# Patient Record
Sex: FEMALE
[De-identification: names, ages, dates, MRNs, and addresses within clinical notes are randomized per-mention and may not be internally consistent; named-entity substitution may affect disease eponyms.]

---

## 2018-12-10 ENCOUNTER — HOSPITAL ENCOUNTER (INPATIENT)
Dept: HOSPITAL 14 - H.ER | Age: 48
LOS: 3 days | Discharge: HOME | DRG: 241 | End: 2018-12-13
Attending: INTERNAL MEDICINE | Admitting: INTERNAL MEDICINE
Payer: MEDICAID

## 2018-12-10 DIAGNOSIS — Z90.49: ICD-10-CM

## 2018-12-10 DIAGNOSIS — D64.9: ICD-10-CM

## 2018-12-10 DIAGNOSIS — Z79.82: ICD-10-CM

## 2018-12-10 DIAGNOSIS — K25.9: Primary | ICD-10-CM

## 2018-12-10 DIAGNOSIS — K44.9: ICD-10-CM

## 2018-12-10 DIAGNOSIS — R13.10: ICD-10-CM

## 2018-12-10 DIAGNOSIS — N92.0: ICD-10-CM

## 2018-12-10 DIAGNOSIS — K29.50: ICD-10-CM

## 2018-12-10 DIAGNOSIS — Z86.73: ICD-10-CM

## 2018-12-10 LAB
ALBUMIN SERPL-MCNC: 4.2 G/DL (ref 3.5–5)
ALBUMIN/GLOB SERPL: 1.4 {RATIO} (ref 1–2.1)
ALT SERPL-CCNC: 28 U/L (ref 9–52)
AST SERPL-CCNC: 18 U/L (ref 14–36)
BASOPHILS # BLD AUTO: 0 K/UL (ref 0–0.2)
BASOPHILS NFR BLD: 1.5 % (ref 0–2)
BUN SERPL-MCNC: 10 MG/DL (ref 7–17)
CALCIUM SERPL-MCNC: 9.1 MG/DL (ref 8.4–10.2)
EOSINOPHIL # BLD AUTO: 0 K/UL (ref 0–0.7)
EOSINOPHIL NFR BLD: 0.6 % (ref 0–4)
ERYTHROCYTE [DISTWIDTH] IN BLOOD BY AUTOMATED COUNT: 17.9 % (ref 11.5–14.5)
GFR NON-AFRICAN AMERICAN: > 60
HGB BLD-MCNC: 7.1 G/DL (ref 12–16)
LIPASE SERPL-CCNC: 36 U/L (ref 23–300)
LYMPHOCYTES # BLD AUTO: 0.4 K/UL (ref 1–4.3)
LYMPHOCYTES NFR BLD AUTO: 13.1 % (ref 20–40)
MCH RBC QN AUTO: 17 PG (ref 27–31)
MCHC RBC AUTO-ENTMCNC: 29.2 G/DL (ref 33–37)
MCV RBC AUTO: 58.2 FL (ref 81–99)
MONOCYTES # BLD: 0.6 K/UL (ref 0–0.8)
MONOCYTES NFR BLD: 18.4 % (ref 0–10)
NEUTROPHILS # BLD: 2.2 K/UL (ref 1.8–7)
NEUTROPHILS NFR BLD AUTO: 66.4 % (ref 50–75)
NRBC BLD AUTO-RTO: 0 % (ref 0–0)
PLATELET # BLD: 350 K/UL (ref 130–400)
PMV BLD AUTO: 9 FL (ref 7.2–11.7)
RBC # BLD AUTO: 4.17 MIL/UL (ref 3.8–5.2)
WBC # BLD AUTO: 3.4 K/UL (ref 4.8–10.8)

## 2018-12-10 PROCEDURE — 30233N1 TRANSFUSION OF NONAUTOLOGOUS RED BLOOD CELLS INTO PERIPHERAL VEIN, PERCUTANEOUS APPROACH: ICD-10-PCS | Performed by: INTERNAL MEDICINE

## 2018-12-10 NOTE — ED PDOC
HPI: Abdomen


Time Seen by Provider: 12/10/18 12:38


Chief Complaint (Nursing): Abdominal Pain


Chief Complaint (Provider): Abdominal Pain


History Per: Patient


History/Exam Limitations: no limitations


Onset/Duration Of Symptoms: Intermittent Episodes (x1 week)


Current Symptoms Are (Timing): Still Present


Additional Complaint(s): 


48 year old female presents to the ED for evaluation of intermittent diffuse 

abdominal pain for the past week associated with nausea, decreased appetite and 

occasional throat pain. She notes the pain worsens when she tries to eat. 

Otherwise denies vomiting, bloody stools, constipation, diarrhea, fever, urinary

symptoms, chest pain, difficulty breathing, and headache.





PMD: [facility]





Past Medical History


Reviewed: Historical Data, Nursing Documentation, Vital Signs


Vital Signs: 





                                Last Vital Signs











Temp  97.0 F L  12/10/18 12:23


 


Pulse  94 H  12/10/18 12:23


 


Resp  16   12/10/18 12:23


 


BP  117/78   12/10/18 12:23


 


Pulse Ox  99   12/10/18 12:23














- Medical History


PMH: Anemia


   Denies: Diabetes, HTN





- Surgical History


Surgical History: Appendectomy


Other surgeries: intestinal surgery





- Family History


Family History: States: Unknown Family Hx





- Social History


Current smoker - smoking cessation education provided: No


Alcohol: None


Drugs: Denies





- Home Medications


Home Medications: 


                                Ambulatory Orders











 Medication  Instructions  Recorded


 


5-Hydroxytryptophan (5-Htp) [5-Htp] 1 cap PO DAILY 12/10/18


 


Lactobacillus Combination No.8 1 cap PO DAILY 12/10/18





[Adult Probiotic]  














- Allergies


Allergies/Adverse Reactions: 


                                    Allergies











Allergy/AdvReac Type Severity Reaction Status Date / Time


 


No Known Allergies Allergy   Verified 12/14/16 15:30














Review of Systems


ROS Statement: Except As Marked, All Systems Reviewed And Found Negative


Constitutional: Negative for: Fever


ENT: Positive for: Throat Pain


Cardiovascular: Negative for: Chest Pain


Respiratory: Negative for: Other (difficulty breathing)


Gastrointestinal: Positive for: Nausea, Abdominal Pain (diffuse).  Negative for:

Vomiting, Diarrhea, Constipation, Other (bloody stool)


Genitourinary Female: Negative for: Dysuria, Frequency, Incontinence


Neurological: Negative for: Headache





Physical Exam





- Reviewed


Nursing Documentation Reviewed: Yes


Vital Signs Reviewed: Yes





- Physical Exam


Appears: Positive for: No Acute Distress


Head Exam: Positive for: ATRAUMATIC, NORMOCEPHALIC


Skin: Positive for: Pallor


Eye Exam: Positive for: Normal appearance, EOMI, PERRL.  Negative for: Scleral 

icterus


ENT: Positive for: Normal ENT Inspection


Neck: Positive for: Normal, Painless ROM, Supple


Cardiovascular/Chest: Positive for: Regular Rate, Rhythm, Tachycardia


Respiratory: Positive for: Normal Breath Sounds.  Negative for: Respiratory 

Distress


Gastrointestinal/Abdominal: Positive for: Normal Exam, Soft, Tenderness (diffuse

mild tenderness).  Negative for: Distended, Guarding, Rebound


Back: Positive for: Normal Inspection.  Negative for: L CVA Tenderness, R CVA 

Tenderness


Rectal: Positive for: Normal Exam.  Negative for: Black Stool, Blood Streaked 

Stool


Extremity: Positive for: Normal ROM (upper and lower extremities).  Negative 

for: Deformity


Neurologic/Psych: Positive for: Alert, Oriented (x3)





- Laboratory Results


Result Diagrams: 


                                 12/10/18 13:24





                                 12/10/18 13:24





- ECG


O2 Sat by Pulse Oximetry: 99 (RA)


Pulse Ox Interpretation: Normal





Medical Decision Making


Medical Decision Making: 


Time: 1311


Initial Impression: abdominal pain


Ddx includes: gastritis, pancreatitis, small bowel obstruction, and possible UTI


Initial Plan: 


--CT abd/pelvis with IV contrast


--CMP


--Lipase


--U-dip


--U-preg


--CBC with differential


--Morphine 2mg IM


--IV fluids


--Iohexol 50ml PO








16:43 


CT abd/pelvis


FINDINGS:





LOWER THORAX:


Unremarkable. 





LIVER:


Unremarkable. No gross lesion or ductal dilatation. 





GALLBLADDER AND BILE DUCTS:


Unremarkable. 





PANCREAS:


Unremarkable. No gross lesion or ductal dilatation.





SPLEEN:


Multiple small (less than 1 cm) splenic cysts., otherwise normal spleen 





ADRENALS:


Unremarkable. No mass. 





KIDNEYS AND URETERS:


Unremarkable. No hydronephrosis. No solid mass. 





VASCULATURE:


Unremarkable. No aortic aneurysm. No atherosclerotic calcification or mural 

plaque present.





BOWEL:


Unremarkable. No obstruction. No gross mural thickening. 





APPENDIX:


No abnormalities to suggest acute appendicitis. No right lower quadrant 

inflammatory processes identified. 





PERITONEUM:


Unremarkable. No free fluid. No free air. 





LYMPH NODES:


Unremarkable. No enlarged lymph nodes. 





BLADDER:


Unremarkable. 





REPRODUCTIVE:


Unremarkable. 





BONES:


No acute fracture. 





OTHER FINDINGS:


None.





IMPRESSION:


No significant or acute  findings to account for/ related to the clinical 

presentation.





Additional benign and/or incidental findings described above.











Chaperone for rectal exam was Kelby Mark. 








16:45 


--Patient experienced a very brief episode of weakness and syncope while in her 

room after walking to the bathroom. She denies hitting her head and was 

conscious when this provider arrived. 





16:57 


--Patient will be admitted to observation due to severe anemia, abdominal pain 

and syncope. 





--------------------------

-----------------------------------------------------------------------


Scribe Attestation:


Documented by Shirin De Souza acting as a scribe for Maria Isabel Hill MD.


Provider Scribe Attestation:


All medical record entries made by the Scribe were at my direction and 

personally dictated by me. I have reviewed the chart and agree that the record 

accurately reflects my personal performance of the history, physical exam, 

medical decision making, and the department course for this patient. I have also

personally directed, reviewed, and agree with the discharge instructions and 

disposition.





Disposition





- Clinical Impression


Clinical Impression: 


 Abdominal pain, Severe anemia, Syncope








- Patient ED Disposition


Is Patient to be Admitted: Yes


Discussed With DrEmmanuel: Naima Craft


Counseled Patient/Family Regarding: Studies Performed, Diagnosis





- Disposition


Disposition Time: 16:57


Condition: FAIR





- Pt Status Changed To:


Hospital Disposition Of: Observation





- POA


Present On Arrival: Falls Or Trauma

## 2018-12-10 NOTE — CP.PCM.HP
History of Present Illness





- History of Present Illness


History of Present Illness: 





CC: weakness





HPI: 48 year old female with reported Hx CVA appx one year ago (not noted on 

head CT today) on aspirin daily, presented to the ED today for several 

complaints. She reports moderate to severe worsening generalized weakness 

ongoing for about one month. She also has heavy menses lasting 5 days a month 

using 5-10 pads daily. Patient denies any hematemesis, melena, hematochezia. She

also complains of generalized burning abdominal pain mild to moderate, 

associated with reflux like symptoms. Patient appears pale, weak. Tachy 101-110,

normotensive. CT abd pel negative. H/H 7.1/24. Afebrile, no wbc/bands. 1 unit 

PRBC ordered in ED, occult blood ordered. Will initiate PPI. Monitor overnight 

for symptomatic anemia. HD stable, no acute distress.





ROS: per HPI all other systems reviewed and negative
































Present on Admission





- Present on Admission


Any Indicators Present on Admission: No





Past Patient History





- Past Social History


Alcohol: None


Drugs: Denies





- CARDIAC


Hx Hypertension: No





- HEMATOLOGICAL/ONCOLOGICAL


Hx Anemia: Yes





- MUSCULOSKELETAL/RHEUMATOLOGICAL


Hx Falls: No





- PSYCHIATRIC


Hx Substance Use: No





- SURGICAL HISTORY


Hx Appendectomy: Yes





- ANESTHESIA


Hx Anesthesia: Yes


Hx Anesthesia Reactions: No





Meds


Allergies/Adverse Reactions: 


                                    Allergies











Allergy/AdvReac Type Severity Reaction Status Date / Time


 


No Known Allergies Allergy   Verified 12/14/16 15:30














Physical Exam





- Constitutional


Appears: Non-toxic, No Acute Distress





- Head Exam


Head Exam: ATRAUMATIC, NORMOCEPHALIC





- Eye Exam


Eye Exam: EOMI, Normal appearance, PERRL





- ENT Exam


ENT Exam: Mucous Membranes Moist, Normal Oropharynx





- Respiratory Exam


Respiratory Exam: Clear to Auscultation Bilateral, NORMAL BREATHING PATTERN





- Cardiovascular Exam


Cardiovascular Exam: Tachycardia, +S1, +S2





- GI/Abdominal Exam


GI & Abdominal Exam: Normal Bowel Sounds, Soft.  absent: Mass, Organomegaly





- Extremities Exam


Extremities exam: Positive for: normal capillary refill, pedal pulses present





- Back Exam


Back exam: absent: CVA tenderness (L), CVA tenderness (R)





- Neurological Exam


Neurological exam: Alert, Oriented x3, Reflexes Normal





- Psychiatric Exam


Psychiatric exam: Normal Affect, Normal Mood





- Skin


Skin Exam: Dry, Pallor, Warm





Results





- Vital Signs


Recent Vital Signs: 





                                Last Vital Signs











Temp  98.1 F   12/10/18 17:09


 


Pulse  99 H  12/10/18 17:59


 


Resp  18   12/10/18 17:59


 


BP  129/68   12/10/18 17:59


 


Pulse Ox  100   12/10/18 17:59














- Labs


Result Diagrams: 


                                 12/10/18 13:24





                                 12/10/18 13:24


Labs: 





                         Laboratory Results - last 24 hr











  12/10/18 12/10/18 12/10/18





  13:24 13:24 17:04


 


WBC  3.4 L  


 


RBC  4.17  


 


Hgb  7.1 L  


 


Hct  24.2 L  


 


MCV  58.2 L  


 


MCH  17.0 L  


 


MCHC  29.2 L  


 


RDW  17.9 H  


 


Plt Count  350  D  


 


MPV  9.0  


 


Neut % (Auto)  66.4  


 


Lymph % (Auto)  13.1 L  


 


Mono % (Auto)  18.4 H  


 


Eos % (Auto)  0.6  


 


Baso % (Auto)  1.5  


 


Neut # (Auto)  2.2  


 


Lymph # (Auto)  0.4 L  


 


Mono # (Auto)  0.6  


 


Eos # (Auto)  0.0  


 


Baso # (Auto)  0.0  


 


Sodium   137 


 


Potassium   3.9 


 


Chloride   103 


 


Carbon Dioxide   25 


 


Anion Gap   13 


 


BUN   10 


 


Creatinine   0.5 L 


 


Est GFR ( Amer)   > 60 


 


Est GFR (Non-Af Amer)   > 60 


 


POC Glucose (mg/dL)    93


 


Random Glucose   90 


 


Calcium   9.1 


 


Total Bilirubin   0.6 


 


AST   18 


 


ALT   28 


 


Alkaline Phosphatase   51 


 


Troponin I   


 


Total Protein   7.3 


 


Albumin   4.2 


 


Globulin   3.0 


 


Albumin/Globulin Ratio   1.4 


 


Lipase   36 


 


Blood Type   


 


Antibody Screen   


 


BBK History Checked   














  12/10/18 12/10/18





  17:05 17:05


 


WBC  


 


RBC  


 


Hgb  


 


Hct  


 


MCV  


 


MCH  


 


MCHC  


 


RDW  


 


Plt Count  


 


MPV  


 


Neut % (Auto)  


 


Lymph % (Auto)  


 


Mono % (Auto)  


 


Eos % (Auto)  


 


Baso % (Auto)  


 


Neut # (Auto)  


 


Lymph # (Auto)  


 


Mono # (Auto)  


 


Eos # (Auto)  


 


Baso # (Auto)  


 


Sodium  


 


Potassium  


 


Chloride  


 


Carbon Dioxide  


 


Anion Gap  


 


BUN  


 


Creatinine  


 


Est GFR ( Amer)  


 


Est GFR (Non-Af Amer)  


 


POC Glucose (mg/dL)  


 


Random Glucose  


 


Calcium  


 


Total Bilirubin  


 


AST  


 


ALT  


 


Alkaline Phosphatase  


 


Troponin I  < 0.0120 


 


Total Protein  


 


Albumin  


 


Globulin  


 


Albumin/Globulin Ratio  


 


Lipase  


 


Blood Type   B POSITIVE


 


Antibody Screen   Negative


 


BBK History Checked   Patient has bt














Assessment & Plan





- Assessment and Plan (Free Text)


Plan: 





48 year old female with reported Hx CVA appx one year ago (not noted on head CT 

today) on aspirin daily, presented to the ED today for several complaints. She 

reports moderate to severe worsening generalized weakness ongoing for about one 

month. She also has heavy menses lasting 5 days a month using 5-10 pads daily. 

Patient denies any hematemesis, melena, hematochezia. She also complains of 

generalized burning abdominal pain mild to moderate, associated with reflux like

symptoms. Patient appears pale, weak. Tachy 101-110, normotensive. CT abd pel 

negative. H/H 7.1/24. Afebrile, no wbc/bands. 1 unit PRBC ordered in ED, occult 

blood ordered. Will initiate PPI. Monitor overnight for symptomatic anemia. HD 

stable, no acute distress.








Symptomatic Anemia


- 1 unit PRBC in ED


- repeat CBC in AM


- HOLD ASA and any ac for dvt ppx


- monitor on tele





Hx of CVA


- patient reports hx of CVA however none noted on head CT





Abdominal Pain, discomfort


- CTAP negative


- start PPI


- Occult blood pending








DVT ppx


SCDs, as concern for bleed given anemia

## 2018-12-10 NOTE — CT
Date of service: 



12/10/2018



PROCEDURE:  CT HEAD WITHOUT CONTRAST.



HISTORY:

Syncope



COMPARISON:

None available.



TECHNIQUE:

Axial computed tomography images were obtained through the head/brain 

without intravenous contrast.  



Radiation dose:



Total exam DLP = 801.5 mGy-cm.



This CT exam was performed using one or more of the following dose 

reduction techniques: Automated exposure control, adjustment of the 

mA and/or kV according to patient size, and/or use of iterative 

reconstruction technique.



FINDINGS:



HEMORRHAGE:

No intracranial hemorrhage. 



BRAIN:

No mass effect or edema.  No atrophy or chronic microvascular 

ischemic changes.



VENTRICLES:

Unremarkable. No hydrocephalus. 



CALVARIUM:

Unremarkable..



PARANASAL SINUSES:

Unremarkable as visualized. No significant inflammatory changes.



MASTOID AIR CELLS:

Unremarkable as visualized. No inflammatory changes.



OTHER FINDINGS:

None.



IMPRESSION:

No acute intracranial hemorrhage

## 2018-12-10 NOTE — CT
Date of service: 



12/10/2018



PROCEDURE:  CT Abdomen and Pelvis with contrast



HISTORY:

Abdominal pain. 



Negative pregnancy test (concurrent with this examination).



COMPARISON:

None.



TECHNIQUE:

Intravenous contrast dose: 90 CC OMNIPAQUE 300



Radiation dose:



Total exam DLP = 370.60 mGy-cm.



This CT exam was performed using one or more of the following dose 

reduction techniques: Automated exposure control, adjustment of the 

mA and/or kV according to patient size, and/or use of iterative 

reconstruction technique.



FINDINGS:



LOWER THORAX:

Unremarkable. 



LIVER:

Unremarkable. No gross lesion or ductal dilatation. 



GALLBLADDER AND BILE DUCTS:

Unremarkable. 



PANCREAS:

Unremarkable. No gross lesion or ductal dilatation.



SPLEEN:

Multiple small (less than 1 cm) splenic cysts., otherwise normal 

spleen 



ADRENALS:

Unremarkable. No mass. 



KIDNEYS AND URETERS:

Unremarkable. No hydronephrosis. No solid mass. 



VASCULATURE:

Unremarkable. No aortic aneurysm. No atherosclerotic calcification or 

mural plaque present.



BOWEL:

Unremarkable. No obstruction. No gross mural thickening. 



APPENDIX:

No abnormalities to suggest acute appendicitis. No right lower 

quadrant inflammatory processes identified. 



PERITONEUM:

Unremarkable. No free fluid. No free air. 



LYMPH NODES:

Unremarkable. No enlarged lymph nodes. 



BLADDER:

Unremarkable. 



REPRODUCTIVE:

Unremarkable. 



BONES:

No acute fracture. 



OTHER FINDINGS:

None.



IMPRESSION:

No significant or acute  findings to account for/ related to the 

clinical presentation.



Additional benign and/or incidental findings described above.

## 2018-12-11 LAB
BUN SERPL-MCNC: 10 MG/DL (ref 7–17)
CALCIUM SERPL-MCNC: 8.7 MG/DL (ref 8.4–10.2)
ERYTHROCYTE [DISTWIDTH] IN BLOOD BY AUTOMATED COUNT: 20.2 % (ref 11.5–14.5)
GFR NON-AFRICAN AMERICAN: > 60
HGB BLD-MCNC: 8 G/DL (ref 12–16)
MCH RBC QN AUTO: 18.8 PG (ref 27–31)
MCHC RBC AUTO-ENTMCNC: 30.5 G/DL (ref 33–37)
MCV RBC AUTO: 61.5 FL (ref 81–99)
PLATELET # BLD: 288 K/UL (ref 130–400)
RBC # BLD AUTO: 4.25 MIL/UL (ref 3.8–5.2)
WBC # BLD AUTO: 3.3 K/UL (ref 4.8–10.8)

## 2018-12-11 RX ADMIN — PANTOPRAZOLE SODIUM SCH MG: 20 TABLET, DELAYED RELEASE ORAL at 09:32

## 2018-12-11 NOTE — RAD
Date of service: 



12/11/2018



PROCEDURE:  Modified barium swallow study.



HISTORY:

dysphagia to liquids and solids



COMPARISON:

None available. 



TECHNIQUE:

Under fluoroscopic guidance, barium meals of various consistency were 

administered to the patient by the speech pathologist.



Fluoroscopic time: 66.5 second. 



Cumulative radiation dose: 2.53 mGy 



FINDINGS:

No penetration or aspiration was observed during this study.



IMPRESSION:

No penetration or aspiration observed. Please refer to the detailed 

report and recommendations of the speech pathologist.

## 2018-12-11 NOTE — CARD
--------------- APPROVED REPORT --------------





Date of service: 12/10/2018



EKG Measurement

Heart [id number]

IN [id number]

[id number]

[id number]

[id number]



<Conclusion>

Sinus tachycardia

Low voltage QRS

Nonspecific ST and T wave abnormality

Abnormal ECG

## 2018-12-12 LAB
% IRON SATURATION: 8 % (ref 20–55)
BUN SERPL-MCNC: 10 MG/DL (ref 7–17)
CALCIUM SERPL-MCNC: 9.2 MG/DL (ref 8.4–10.2)
ERYTHROCYTE [DISTWIDTH] IN BLOOD BY AUTOMATED COUNT: 24.9 % (ref 11.5–14.5)
GFR NON-AFRICAN AMERICAN: > 60
HGB BLD-MCNC: 9.6 G/DL (ref 12–16)
IRON SERPL-MCNC: 33 UG/DL (ref 37–170)
MCH RBC QN AUTO: 19.8 PG (ref 27–31)
MCHC RBC AUTO-ENTMCNC: 30.7 G/DL (ref 33–37)
MCV RBC AUTO: 64.5 FL (ref 81–99)
PLATELET # BLD: 312 K/UL (ref 130–400)
RBC # BLD AUTO: 4.85 MIL/UL (ref 3.8–5.2)
TIBC SERPL-MCNC: 397 UG/DL (ref 250–450)
WBC # BLD AUTO: 4.2 K/UL (ref 4.8–10.8)

## 2018-12-12 RX ADMIN — PANTOPRAZOLE SODIUM SCH MG: 20 TABLET, DELAYED RELEASE ORAL at 08:51

## 2018-12-12 NOTE — CP.PCM.PN
Subjective





- Date & Time of Evaluation


Date of Evaluation: 12/12/18


Time of Evaluation: 07:00





- Subjective


Subjective: 





Pt seen and examined at bedside. Reports improvement in abdominal pain, however,

still occurs while eating. No acute events overnight. Denies N/V/CP/SOB





Objective





- Vital Signs/Intake and Output


Vital Signs (last 24 hours): 


                                        











Temp Pulse Resp BP Pulse Ox


 


 98.5 F   77   20   101/63   99 


 


 12/12/18 12:21  12/12/18 12:21  12/12/18 12:21  12/12/18 12:21  12/12/18 12:21











- Medications


Medications: 


                               Current Medications





Acetaminophen (Tylenol 325mg Tab)  650 mg PO Q6 PRN


   PRN Reason: Pain, Mild (1-3)


Docusate Sodium (Colace)  100 mg PO BID [facility]


   Last Admin: 12/12/18 09:53 Dose:  100 mg


Morphine Sulfate (Morphine)  1 mg IVP Q4 PRN


   PRN Reason: Pain, moderate (4-7)


   Last Admin: 12/12/18 06:13 Dose:  1 mg


Morphine Sulfate (Morphine)  2 mg IVP Q4 PRN


   PRN Reason: Pain, severe (8-10)


   Last Admin: 12/11/18 20:38 Dose:  2 mg


Pantoprazole Sodium (Protonix Ec Tab)  20 mg PO DAILY [facility]


   Last Admin: 12/12/18 08:51 Dose:  20 mg


Sucralfate (Carafate Tab)  1 gm PO TID [facility]


   Last Admin: 12/12/18 12:03 Dose:  1 gm











- Labs


Labs: 


                                        





                                 12/12/18 04:25 





                                 12/12/18 04:25 











- Constitutional


Appears: Well, Non-toxic





- Eye Exam


Eye Exam: EOMI





- ENT Exam


ENT Exam: Mucous Membranes Moist





- Respiratory Exam


Respiratory Exam: Clear to Ausculation Bilateral, NORMAL BREATHING PATTERN.  ab

sent: Wheezes





- Cardiovascular Exam


Cardiovascular Exam: REGULAR RHYTHM, +S1, +S2





- GI/Abdominal Exam


GI & Abdominal Exam: Soft, Tenderness (epigastric), Normal Bowel Sounds





- Neurological Exam


Neurological Exam: Alert, Awake, CN II-XII Intact, Normal Gait, Oriented x3





- Psychiatric Exam


Psychiatric exam: Normal Affect, Normal Mood





Assessment and Plan





- Assessment and Plan (Free Text)


Assessment: 





49 yo F with pmhs of reported CVA 1 year ago on daily ASA admitted for sympt

omatic anemia. 


Plan: 





Anemia


-Symptomatic: fatigue/lethargy; improved s/p 2 unit PRBC


-H/H: 7.1/24.2 > 8.0/26.2 > 9.6/31.3


-MCV: 61.5 


-FE: 33; TIBC: 397; % SAT: 8; Ferritin: 14.4


-LMP 11/28/2018; no active vaginal bleed


-f/u labs





Dysphagia


-2 months: progressive with solids and fluids


-Hx of candida esophagitis, treated


-Barium swallow screen: no penetration or aspiration


-Upper GI series: pending


-R/O gopal guzman 


-GI: Dr. Malcolm: EGD 12/13/2018





Epigstric pain


-Intermittent; with foods


-ddx: gastric ulcer


-CTAP negative


-on ppi


-started sucralfate


-pending occult blood


-GI: for EGD 12/13/2018





Hx of CVA


-CT head reviewed


-held ASA





DVT/GI prophylaxis


-SCDs


-Pantoprazole





Future discharge planning: to f/u with Dr. Shah 





Plan d/w Dr. Jeanie Coles MD PGY2

## 2018-12-12 NOTE — RAD
Date of service: 



12/12/2018



HISTORY:

 dysphagia solids and liquids 



COMPARISON:

None available.



FINDINGS:



BOWEL:

Extensive contrast throughout the left and right colon including 

portions of the transverse colon.  Colonic diverticulosis suggested.  

Evaluation of the gastric antrum duodenum would be expected to be 

obscured



No bowel obstruction noted. 



BONES:

Normal.



OTHER FINDINGS:

None.



IMPRESSION:

Extensive residual contrast throughout the colon including the 

transverse colon.



Left colonic diverticulosis noted.  No bowel obstruction suggested.

## 2018-12-12 NOTE — CP.PCM.CON
History of Present Illness





- History of Present Illness


History of Present Illness: 





48 yo female with h/o CVA a year ago and on ASA admitted with severe anemia. Has

been having heavy periods. Reports difficulty swallowing and pain in throat. 

Modified Barium swallow was negative.





Review of Systems





- Constitutional


Constitutional: absent: Chills





- EENT


Eyes: absent: Blurred Vision


Ears: absent: Decreased Hearing


Nose/Mouth/Throat: absent: Nasal Congestion





- Cardiovascular


Cardiovascular: absent: Chest Pain





- Respiratory


Respiratory: absent: Dyspnea





- Gastrointestinal


Gastrointestinal: absent: Abdominal Pain





- Genitourinary


Genitourinary: absent: Change in Urinary Stream





Past Patient History





- Past Medical History & Family History


Past Medical History?: Yes





- Past Social History


Smoking Status: Never Smoked





- CARDIAC


Hx Cardiac Disorders: No


Hx Hypertension: No





- PULMONARY


Hx Respiratory Disorders: No





- NEUROLOGICAL


Hx Neurological Disorder: No





- HEENT


Hx HEENT Problems: No





- RENAL


Hx Chronic Kidney Disease: No





- ENDOCRINE/METABOLIC


Hx Endocrine Disorders: No





- HEMATOLOGICAL/ONCOLOGICAL


Hx Blood Disorders: Yes


Hx Anemia: Yes





- INTEGUMENTARY


Hx Dermatological Problems: No





- MUSCULOSKELETAL/RHEUMATOLOGICAL


Hx Musculoskeletal Disorders: No


Hx Falls: No





- GASTROINTESTINAL


Hx Gastrointestinal Disorders: No





- GENITOURINARY/GYNECOLOGICAL


Hx Genitourinary Disorders: No





- PSYCHIATRIC


Hx Psychophysiologic Disorder: No


Hx Substance Use: No





- SURGICAL HISTORY


Hx Surgeries: Yes


Hx Appendectomy: Yes


Hx  Section: Yes


Other/Comment: intestinal surgery





- ANESTHESIA


Hx Anesthesia: Yes


Hx Anesthesia Reactions: No


Hx Malignant Hyperthermia: No


Has any member of the family had a problem w/ anesthesia?: No





Meds


Allergies/Adverse Reactions: 


                                    Allergies











Allergy/AdvReac Type Severity Reaction Status Date / Time


 


No Known Allergies Allergy   Verified 16 15:30














- Medications


Medications: 


                               Current Medications





Acetaminophen (Tylenol 325mg Tab)  650 mg PO Q6 PRN


   PRN Reason: Pain, Mild (1-3)


Docusate Sodium (Colace)  100 mg PO BID [facility]


   Last Admin: 18 16:38 Dose:  100 mg


Morphine Sulfate (Morphine)  1 mg IVP Q4 PRN


   PRN Reason: Pain, moderate (4-7)


   Last Admin: 18 06:13 Dose:  1 mg


Morphine Sulfate (Morphine)  2 mg IVP Q4 PRN


   PRN Reason: Pain, severe (8-10)


   Last Admin: 18 20:38 Dose:  2 mg


Pantoprazole Sodium (Protonix Ec Tab)  20 mg PO DAILY [facility]


   Last Admin: 18 08:51 Dose:  20 mg


Sucralfate (Carafate Tab)  1 gm PO TID [facility]


   Last Admin: 18 16:38 Dose:  1 gm











Physical Exam





- Constitutional


Appears: No Acute Distress





- Head Exam


Head Exam: ATRAUMATIC





- Eye Exam


Eye Exam: Normal appearance





- ENT Exam


ENT Exam: Mucous Membranes Moist





- Respiratory Exam


Respiratory Exam: Clear to Auscultation Bilateral





- Cardiovascular Exam


Cardiovascular Exam: REGULAR RHYTHM, +S1, +S2





- GI/Abdominal Exam


GI & Abdominal Exam: Normal Bowel Sounds, Soft.  absent: Tenderness





Results





- Vital Signs


Recent Vital Signs: 


                                Last Vital Signs











Temp  97.9 F   18 16:00


 


Pulse  77   18 16:00


 


Resp  16   18 16:00


 


BP  108/72   18 16:00


 


Pulse Ox  98   18 16:00














- Labs


Result Diagrams: 


                                 18 04:25





                                 18 04:25


Labs: 


                         Laboratory Results - last 24 hr











  12/10/18 12/11/18 12/12/18





  17:05 10:32 04:25


 


WBC    4.2 L


 


RBC    4.85


 


Hgb    9.6 L


 


Hct    31.3 L


 


MCV    64.5 L D


 


MCH    19.8 L


 


MCHC    30.7 L


 


RDW    24.9 H


 


Plt Count    312


 


Sodium   


 


Potassium   


 


Chloride   


 


Carbon Dioxide   


 


Anion Gap   


 


BUN   


 


Creatinine   


 


Est GFR ( Amer)   


 


Est GFR (Non-Af Amer)   


 


Random Glucose   


 


Calcium   


 


Iron   


 


TIBC   


 


% Saturation   


 


Ferritin   


 


HIV 1&2 Ag/Ab, 4th Gen   Nonreactive 


 


Crossmatch  See Detail  














  18





  04:25 10:02 10:02


 


WBC   


 


RBC   


 


Hgb   


 


Hct   


 


MCV   


 


MCH   


 


MCHC   


 


RDW   


 


Plt Count   


 


Sodium  139  


 


Potassium  3.7  


 


Chloride  102  


 


Carbon Dioxide  27  


 


Anion Gap  14  


 


BUN  10  


 


Creatinine  0.6 L  


 


Est GFR ( Amer)  > 60  


 


Est GFR (Non-Af Amer)  > 60  


 


Random Glucose  86  


 


Calcium  9.2  


 


Iron   33 L 


 


TIBC   397 


 


% Saturation   8 L 


 


Ferritin    14.4


 


HIV 1&2 Ag/Ab, 4th Gen   


 


Crossmatch   














Assessment & Plan


(1) Dysphagia


Assessment and Plan: 


47 yo with dysphagia and odynophagia. Upper endoscopy to r/o esophagitis, ulcer 

or partially obstructing esophageal lesion.


Status: Acute

## 2018-12-13 VITALS
DIASTOLIC BLOOD PRESSURE: 65 MMHG | SYSTOLIC BLOOD PRESSURE: 100 MMHG | OXYGEN SATURATION: 99 % | HEART RATE: 76 BPM | RESPIRATION RATE: 16 BRPM

## 2018-12-13 VITALS — TEMPERATURE: 98 F

## 2018-12-13 LAB
APTT BLD: 27 SECONDS (ref 25.6–37.1)
BUN SERPL-MCNC: 15 MG/DL (ref 7–17)
CALCIUM SERPL-MCNC: 9.4 MG/DL (ref 8.4–10.2)
ERYTHROCYTE [DISTWIDTH] IN BLOOD BY AUTOMATED COUNT: 25.4 % (ref 11.5–14.5)
GFR NON-AFRICAN AMERICAN: > 60
HGB BLD-MCNC: 9.9 G/DL (ref 12–16)
INR PPP: 1.1
MCH RBC QN AUTO: 19.8 PG (ref 27–31)
MCHC RBC AUTO-ENTMCNC: 30.7 G/DL (ref 33–37)
MCV RBC AUTO: 64.6 FL (ref 81–99)
PLATELET # BLD: 300 K/UL (ref 130–400)
PROTHROMBIN TIME: 12.3 SECONDS (ref 9.8–13.1)
RBC # BLD AUTO: 4.99 MIL/UL (ref 3.8–5.2)
WBC # BLD AUTO: 4.5 K/UL (ref 4.8–10.8)

## 2018-12-13 PROCEDURE — 0DB38ZX EXCISION OF LOWER ESOPHAGUS, VIA NATURAL OR ARTIFICIAL OPENING ENDOSCOPIC, DIAGNOSTIC: ICD-10-PCS | Performed by: INTERNAL MEDICINE

## 2018-12-13 PROCEDURE — 0DB18ZX EXCISION OF UPPER ESOPHAGUS, VIA NATURAL OR ARTIFICIAL OPENING ENDOSCOPIC, DIAGNOSTIC: ICD-10-PCS | Performed by: INTERNAL MEDICINE

## 2018-12-13 NOTE — CP.PCM.DIS
Provider





- Provider


Date of Admission: 


12/12/18 14:51





Attending physician: 


Naima Craft DO





Consults: 








12/11/18 09:22


Gastroenterology Consult Routine 


   Comment: 


   Consulting Provider: Brant Malcolm


   Consulting Physician: Brant Malcolm


   Reason for Consult: dysphagia to solids and liquids











Time Spent in preparation of Discharge (in minutes): 30





Hospital Course





- Lab Results


Lab Results: 


                             Most Recent Lab Values











WBC  4.5 K/uL (4.8-10.8)  L  12/13/18  04:20    


 


RBC  4.99 Mil/uL (3.80-5.20)   12/13/18  04:20    


 


Hgb  9.9 g/dL (12.0-16.0)  L  12/13/18  04:20    


 


Hct  32.2 % (34.0-47.0)  L  12/13/18  04:20    


 


MCV  64.6 fl (81.0-99.0)  L  12/13/18  04:20    


 


MCH  19.8 pg (27.0-31.0)  L  12/13/18  04:20    


 


MCHC  30.7 g/dL (33.0-37.0)  L  12/13/18  04:20    


 


RDW  25.4 % (11.5-14.5)  H  12/13/18  04:20    


 


Plt Count  300 K/uL (130-400)   12/13/18  04:20    


 


MPV  9.0 fl (7.2-11.7)   12/10/18  13:24    


 


Neut % (Auto)  66.4 % (50.0-75.0)   12/10/18  13:24    


 


Lymph % (Auto)  13.1 % (20.0-40.0)  L  12/10/18  13:24    


 


Mono % (Auto)  18.4 % (0.0-10.0)  H  12/10/18  13:24    


 


Eos % (Auto)  0.6 % (0.0-4.0)   12/10/18  13:24    


 


Baso % (Auto)  1.5 % (0.0-2.0)   12/10/18  13:24    


 


Neut # (Auto)  2.2 K/uL (1.8-7.0)   12/10/18  13:24    


 


Lymph # (Auto)  0.4 K/uL (1.0-4.3)  L  12/10/18  13:24    


 


Mono # (Auto)  0.6 K/uL (0.0-0.8)   12/10/18  13:24    


 


Eos # (Auto)  0.0 K/uL (0.0-0.7)   12/10/18  13:24    


 


Baso # (Auto)  0.0 K/uL (0.0-0.2)   12/10/18  13:24    


 


PT  12.3 Seconds (9.8-13.1)   12/13/18  04:20    


 


INR  1.1   12/13/18  04:20    


 


APTT  27.0 Seconds (25.6-37.1)   12/13/18  04:20    


 


Sodium  140 mmol/l (132-148)   12/13/18  04:20    


 


Potassium  3.8 MMOL/L (3.6-5.0)   12/13/18  04:20    


 


Chloride  103 mmol/L ()   12/13/18  04:20    


 


Carbon Dioxide  26 mmol/L (22-30)   12/13/18  04:20    


 


Anion Gap  15  (10-20)   12/13/18  04:20    


 


BUN  15 mg/dl (7-17)   12/13/18  04:20    


 


Creatinine  0.7 mg/dl (0.7-1.2)   12/13/18  04:20    


 


Est GFR ( Amer)  > 60   12/13/18  04:20    


 


Est GFR (Non-Af Amer)  > 60   12/13/18  04:20    


 


POC Glucose (mg/dL)  93 mg/dL ()   12/10/18  17:04    


 


Random Glucose  78 mg/dL ()   12/13/18  04:20    


 


Calcium  9.4 mg/dL (8.4-10.2)   12/13/18  04:20    


 


Iron  33 ug/dL ()  L  12/12/18  10:02    


 


TIBC  397 ug/dL (250-450)   12/12/18  10:02    


 


% Saturation  8 % (20-55)  L  12/12/18  10:02    


 


Ferritin  14.4 ng/Ml (6..0)   12/12/18  10:02    


 


Total Bilirubin  0.6 mg/dl (0.2-1.3)   12/10/18  13:24    


 


AST  18 U/L (14-36)   12/10/18  13:24    


 


ALT  28 U/L (9-52)   12/10/18  13:24    


 


Alkaline Phosphatase  51 U/L ()   12/10/18  13:24    


 


Troponin I  < 0.0120 ng/mL (0.00-0.120)   12/10/18  17:05    


 


Total Protein  7.3 G/DL (6.3-8.2)   12/10/18  13:24    


 


Albumin  4.2 g/dL (3.5-5.0)   12/10/18  13:24    


 


Globulin  3.0 gm/dL (2.2-3.9)   12/10/18  13:24    


 


Albumin/Globulin Ratio  1.4  (1.0-2.1)   12/10/18  13:24    


 


Lipase  36 U/L ()   12/10/18  13:24    


 


Stool Occult Blood  Negative  (NEGATIVE)   12/13/18  05:15    


 


HIV-1 Ab Rapid Screen  Non reactive  (NON REAC)   12/11/18  10:32    


 


HIV 1&2 Ag/Ab, 4th Gen  Nonreactive  (Nonreactive)   12/11/18  10:32    


 


Blood Type  B POSITIVE   12/10/18  17:05    


 


Antibody Screen  Negative   12/10/18  17:05    


 


Crossmatch  See Detail   12/10/18  17:05    


 


BBK History Checked  Patient has bt   12/10/18  17:05    














- Hospital Course


Hospital Course: 





47 yo F with pmhx of reported CVA 1 year ago on daily ASA admitted for 

symptomatic anemia. 


Anemia: s/p 2 unit PRBC; H/H: 7.1/24.2 > 8.0/26.2 > 9.6/31.3; MCV: 61.5; FE: 33;

TIBC: 397; % SAT: 8; Ferritin: 14.4; LMP 11/28/2018; no active vaginal bleed


Dysphagia: 2 months: progressive with solids and fluids and Hx of candida 

esophagitis, treated; 


Epigstric pain: Intermittent; with foods likely gastric ulcer; CTAP negative; 

occult blood: negative; on ppi and started sucralfate





-GI: Dr. Malcolm: Barium swallow screen: no penetration or aspiration; EGD: 

small sliding hiatal hernia, chronic gastritis; biopsied. normal examined 

duodenum





Pt d/c home with omeprazole and sucralfate. Pt has appointment scheduled with 

Dr. Castillo at [facility]. 


Recommend f/u with colonoscopy. 








Plan dw Dr Jeanie Coles MD PGY-2





Discharge Exam





- Head Exam


Head Exam: ATRAUMATIC





- Eye Exam


Eye Exam: EOMI





- ENT Exam


ENT Exam: Mucous Membranes Moist





- Respiratory Exam


Respiratory Exam: Clear to PA & Lateral.  absent: Wheezes





- Cardiovascular Exam


Cardiovascular Exam: REGULAR RHYTHM, +S1, +S2





- GI/Abdominal Exam


GI & Abdominal Exam: Normal Bowel Sounds, Soft





- Neurological Exam


Neurological exam: Alert, CN II-XII Intact, Normal Gait, Oriented x3





- Psychiatric Exam


Psychiatric exam: Normal Affect, Normal Mood





Discharge Plan





- Discharge Medications


Prescriptions: 


Omeprazole 20 mg PO DAILY #30 tab.rap.


Sucralfate [Carafate Tab] 1 gm PO TID #90 tab





- Follow Up Plan


Condition: FAIR


Disposition: HOME/ ROUTINE


Instructions:  Acute Abdomen (Belly Pain), Adult (DC), Anemia of Chronic Disease

(DC), Hiatal Hernia (DC), Acute Abdominal Pain (DC), Acute Abdominal Pain (GEN)


Additional Instructions: 


pt has scheduled follow up appt in the clinic with  on 1/14/18


Referrals: 


formerly [facility] [Outside]


Brant Malcolm MD [Staff Provider] -

## 2023-11-25 NOTE — CP.PCM.PN
Subjective





- Date & Time of Evaluation


Date of Evaluation: 12/11/18


Time of Evaluation: 07:48





- Subjective


Subjective: 


   


Pt seen and examined at bedside. Denies acute overnight events. Reports 

generalized improvement after 1 unit of transfused blood. However, pt reports 

continued throat pain with swallowing and epigastric pain. 


She reports being treated for candida esophagitis recently. (Unknown time frame)








Objective





- Vital Signs/Intake and Output


Vital Signs (last 24 hours): 


                                        











Temp Pulse Resp BP Pulse Ox


 


 98.1 F   74   18   98/61 L  98 


 


 12/11/18 04:49  12/11/18 04:49  12/11/18 04:49  12/11/18 04:49  12/11/18 04:49








Intake and Output: 


                                        











 12/11/18 12/11/18





 06:59 18:59


 


Intake Total 375 


 


Balance 375 














- Medications


Medications: 


                               Current Medications





Acetaminophen (Tylenol 325mg Tab)  650 mg PO Q6 PRN


   PRN Reason: Pain, Mild (1-3)


Morphine Sulfate (Morphine)  1 mg IVP Q4 PRN


   PRN Reason: Pain, moderate (4-7)


   Last Admin: 12/11/18 02:13 Dose:  1 mg


Morphine Sulfate (Morphine)  2 mg IVP Q4 PRN


   PRN Reason: Pain, severe (8-10)


   Last Admin: 12/10/18 20:20 Dose:  2 mg


Pantoprazole Sodium (Protonix Ec Tab)  20 mg PO DAILY [facility]











- Labs


Labs: 


                                        





                                 12/11/18 04:05 





                                 12/11/18 04:05 











- Constitutional


Appears: Well, Non-toxic, No Acute Distress





- Eye Exam


Eye Exam: EOMI





- ENT Exam


ENT Exam: Mucous Membranes Moist


Additional comments: 





No oral thrush





- Neck Exam


Neck Exam: Full ROM





- Respiratory Exam


Respiratory Exam: Clear to Ausculation Bilateral, NORMAL BREATHING PATTERN.  

absent: Wheezes





- Cardiovascular Exam


Cardiovascular Exam: REGULAR RHYTHM, +S1, +S2





- GI/Abdominal Exam


GI & Abdominal Exam: Soft, Normal Bowel Sounds.  absent: Tenderness





- Neurological Exam


Neurological Exam: Alert, Awake, CN II-XII Intact, Oriented x3





- Psychiatric Exam


Psychiatric exam: Normal Affect, Normal Mood





Assessment and Plan





- Assessment and Plan (Free Text)


Assessment: 





47 yo F with pmhs of reported CVA 1 year ago on daily ASA admitted for 

symptomatic anemia. 


Plan: 





Anemia


-Symptomatic: fatigue/lethargy; improved s/p 1 unit PRBC


-H/H: 7.1/24.2 > 8.0/26.2


-MCV: 61.5


-s/p 1 unit PRBC pending 1 unit


-LMP 11/28/2018; no active vaginal bleed


-f/u labs





Dysphagia


-2 months: progressive with solids and fluids


-Hx of candida esophagitis, treated


-Barium swallow screen; R/O gopaldevon guzman 


-GI: Dr. Malcolm





Epigstric pain


-Intermittent


-CTAP negative


-on ppi


-pending occult blood





Hx of CVA


-CT head reviewed


-held ASA





DVT/GI prophylaxis


-SCDs


-Pantoprazole





Future discharge planning: to f/u with Dr. Shah 





Plan d/w Dr. Jeanie Coles MD PGY2 No